# Patient Record
Sex: FEMALE
[De-identification: names, ages, dates, MRNs, and addresses within clinical notes are randomized per-mention and may not be internally consistent; named-entity substitution may affect disease eponyms.]

---

## 2021-02-11 ENCOUNTER — APPOINTMENT (OUTPATIENT)
Dept: PEDIATRIC ALLERGY IMMUNOLOGY | Facility: CLINIC | Age: 21
End: 2021-02-11

## 2021-04-12 ENCOUNTER — APPOINTMENT (OUTPATIENT)
Dept: PEDIATRIC ALLERGY IMMUNOLOGY | Facility: CLINIC | Age: 21
End: 2021-04-12
Payer: MEDICAID

## 2021-04-12 VITALS
TEMPERATURE: 97.8 F | HEIGHT: 58 IN | WEIGHT: 172 LBS | DIASTOLIC BLOOD PRESSURE: 70 MMHG | SYSTOLIC BLOOD PRESSURE: 110 MMHG | BODY MASS INDEX: 36.11 KG/M2

## 2021-04-12 PROBLEM — Z00.00 ENCOUNTER FOR PREVENTIVE HEALTH EXAMINATION: Status: ACTIVE | Noted: 2021-04-12

## 2021-04-12 PROCEDURE — 95004 PERQ TESTS W/ALRGNC XTRCS: CPT

## 2021-04-12 PROCEDURE — 99204 OFFICE O/P NEW MOD 45 MIN: CPT | Mod: 25

## 2021-04-12 PROCEDURE — 99072 ADDL SUPL MATRL&STAF TM PHE: CPT

## 2021-04-12 NOTE — ASSESSMENT
[FreeTextEntry1] : Allergy skin prick testing revealed positives to tree pollen, weeds and dust mites. She will return next week to complete testing.

## 2021-04-12 NOTE — PHYSICAL EXAM
[Alert] : alert [Well Nourished] : well nourished [Healthy Appearance] : healthy appearance [No Acute Distress] : no acute distress [Well Developed] : well developed [Normal Pupil & Iris Size/Symmetry] : normal pupil and iris size and symmetry [No Discharge] : no discharge [No Photophobia] : no photophobia [Sclera Not Icteric] : sclera not icteric [Normal TMs] : both tympanic membranes were normal [Normal Lips/Tongue] : the lips and tongue were normal [Normal Outer Ear/Nose] : the ears and nose were normal in appearance [Normal Tonsils] : normal tonsils [No Thrush] : no thrush [Pale mucosa] : pale mucosa [Boggy Nasal Turbinates] : boggy and/or pale nasal turbinates [Posterior Pharyngeal Cobblestoning] : posterior pharyngeal cobblestoning [Clear Rhinorrhea] : clear rhinorrhea was seen [Supple] : the neck was supple [Normal Rate and Effort] : normal respiratory rhythm and effort [No Crackles] : no crackles [No Retractions] : no retractions [Bilateral Audible Breath Sounds] : bilateral audible breath sounds [Normal Rate] : heart rate was normal  [Normal S1, S2] : normal S1 and S2 [No murmur] : no murmur [Regular Rhythm] : with a regular rhythm [Soft] : abdomen soft [Not Tender] : non-tender [Not Distended] : not distended [No HSM] : no hepato-splenomegaly [Normal Cervical Lymph Nodes] : cervical [Skin Intact] : skin intact  [No Rash] : no rash [No Skin Lesions] : no skin lesions [No clubbing] : no clubbing [No Edema] : no edema [No Cyanosis] : no cyanosis [Normal Mood] : mood was normal [Normal Affect] : affect was normal [Alert, Awake, Oriented as Age-Appropriate] : alert, awake, oriented as age appropriate

## 2021-04-12 NOTE — REASON FOR VISIT
[Initial Evaluation] : an initial evaluation of [Hay Fever] : hay fever [Congestion] : congestion [Runny Nose] : runny nose [Itchy Eyes] : itchy eyes [Discharge of Eyes] : discharge of eyes [Asthma] : asthma [Other: _____] : [unfilled]

## 2021-04-12 NOTE — HISTORY OF PRESENT ILLNESS
[Cough] : cough [Wheezing] : wheezing  [de-identified] : MAICOL MEDINA is a 20 year year old female with a history of asthma since childhood with a nebulizer, not hospitalized. SHe continued to suffer from asthma. Year round nasal allergy symptoms. Sneezing, itchy nose, itchy eyes. Currently taking medications listed below. She has a dog in the house. And her nasal allergy symptoms are gradually getting worse over the years. And she wants to get treated.

## 2021-04-12 NOTE — SOCIAL HISTORY
[Apartment] : [unfilled] lives in an apartment  [Radiator/Baseboard] : heating provided by radiator(s)/baseboard(s) [Window Units] : air conditioning provided by window units [Humidifier] : uses a humidifier [Dry] : dry [Dog] : dog [Single] : single [FreeTextEntry2] : [employer] [Dust Mite Covers] : does not have dust mite covers [Bedroom] : not in the bedroom [Basement] : not in the basement [Living Area] : not in the living area [Smokers in Household] : there are no smokers in the home [de-identified] : air purifier as well [de-identified] : Brendan

## 2021-04-12 NOTE — IMPRESSION
[Allergy Testing Dust Mite] : dust mites [Allergy Testing Cockroach] : cockroach [Allergy Testing Mixed Feathers] : feathers [Allergy Testing Dog] : dog [Allergy Testing Cat] : cat [] : molds [Allergy Testing Trees] : trees [Allergy Testing Weeds] : weeds [Allergy Testing Grasses] : grasses [________] : [unfilled]

## 2021-04-12 NOTE — REVIEW OF SYSTEMS
[Rhinorrhea] : rhinorrhea [Nasal Congestion] : nasal congestion [Sore Throat] : sore throat [Post Nasal Drip] : post nasal drip [Cough] : cough [Wheezing] : wheezing [Nl] : Genitourinary

## 2021-04-19 ENCOUNTER — APPOINTMENT (OUTPATIENT)
Dept: PEDIATRIC ALLERGY IMMUNOLOGY | Facility: CLINIC | Age: 21
End: 2021-04-19
Payer: MEDICAID

## 2021-04-19 VITALS — HEIGHT: 58 IN | BODY MASS INDEX: 36.11 KG/M2 | WEIGHT: 172 LBS

## 2021-04-19 PROCEDURE — 95004 PERQ TESTS W/ALRGNC XTRCS: CPT

## 2021-04-19 PROCEDURE — 99072 ADDL SUPL MATRL&STAF TM PHE: CPT

## 2021-04-19 PROCEDURE — 99212 OFFICE O/P EST SF 10 MIN: CPT | Mod: 25

## 2021-04-19 NOTE — HISTORY OF PRESENT ILLNESS
[None] : The patient is currently asymptomatic [de-identified] : MAICOL MEDINA is a 20 year year old female with a history of asthma since childhood with a nebulizer, not hospitalized. SHe continued to suffer from asthma. Year round nasal allergy symptoms. Sneezing, itchy nose, itchy eyes. Currently taking medications listed below. She has a dog in the house. And her nasal allergy symptoms are gradually getting worse over the years. And she wants to get treated.

## 2021-08-05 ENCOUNTER — APPOINTMENT (OUTPATIENT)
Dept: PEDIATRIC ALLERGY IMMUNOLOGY | Facility: CLINIC | Age: 21
End: 2021-08-05
Payer: MEDICAID

## 2021-08-05 VITALS — BODY MASS INDEX: 35.05 KG/M2 | WEIGHT: 167 LBS | HEIGHT: 58 IN

## 2021-08-05 DIAGNOSIS — H10.10 ACUTE ATOPIC CONJUNCTIVITIS, UNSPECIFIED EYE: ICD-10-CM

## 2021-08-05 PROCEDURE — 99213 OFFICE O/P EST LOW 20 MIN: CPT

## 2021-08-05 NOTE — PHYSICAL EXAM
[Alert] : alert [Well Nourished] : well nourished [Healthy Appearance] : healthy appearance [No Acute Distress] : no acute distress [Well Developed] : well developed [Normal Pupil & Iris Size/Symmetry] : normal pupil and iris size and symmetry [No Discharge] : no discharge [No Photophobia] : no photophobia [Sclera Not Icteric] : sclera not icteric [Normal TMs] : both tympanic membranes were normal [Normal Nasal Mucosa] : the nasal mucosa was normal [Normal Lips/Tongue] : the lips and tongue were normal [Normal Outer Ear/Nose] : the ears and nose were normal in appearance [Normal Tonsils] : normal tonsils [No Thrush] : no thrush [Supple] : the neck was supple [Normal Rate and Effort] : normal respiratory rhythm and effort [No Crackles] : no crackles [No Retractions] : no retractions [Bilateral Audible Breath Sounds] : bilateral audible breath sounds [Wheezing] : wheezing was heard [Normal Rate] : heart rate was normal  [Normal S1, S2] : normal S1 and S2 [No murmur] : no murmur [Regular Rhythm] : with a regular rhythm [Soft] : abdomen soft [Not Tender] : non-tender [Not Distended] : not distended [No HSM] : no hepato-splenomegaly [Normal Cervical Lymph Nodes] : cervical [Skin Intact] : skin intact  [No Rash] : no rash [No Skin Lesions] : no skin lesions [No clubbing] : no clubbing [No Edema] : no edema [No Cyanosis] : no cyanosis [Normal Mood] : mood was normal [Normal Affect] : affect was normal [Alert, Awake, Oriented as Age-Appropriate] : alert, awake, oriented as age appropriate [Pale mucosa] : no pale mucosa [de-identified] : End expiratory wheezing

## 2021-08-05 NOTE — HISTORY OF PRESENT ILLNESS
[de-identified] : MAICOL MEDINA is a 20 year year old female with a history of asthma since childhood with a nebulizer, not hospitalized. SHe continued to suffer from asthma. Year round nasal allergy symptoms. Sneezing, itchy nose, itchy eyes. Currently taking medications listed below. She has a dog in the house. And her nasal allergy symptoms are gradually getting worse over the years. And she wants to get treated. She is being followed by Dr Esteban García for her asthma. He just started her on Fasenra and she has done well on it for the last 3 months.

## 2021-08-05 NOTE — ASSESSMENT
[FreeTextEntry1] : Allergy skin prick testing revealed positives to tree pollen, weeds and dust mites. Intradermal testing revealed positives to grass pollen, molds cat and dog. She has been on Fasenra under the care of her pulmonologist over the last 3 months with no issues. I will see her in Early November and consider immunotherapy after her current treatment is over.

## 2021-12-09 ENCOUNTER — APPOINTMENT (OUTPATIENT)
Dept: PEDIATRIC ALLERGY IMMUNOLOGY | Facility: CLINIC | Age: 21
End: 2021-12-09
Payer: MEDICAID

## 2021-12-09 VITALS
DIASTOLIC BLOOD PRESSURE: 70 MMHG | HEIGHT: 58 IN | SYSTOLIC BLOOD PRESSURE: 100 MMHG | BODY MASS INDEX: 35.05 KG/M2 | WEIGHT: 167 LBS

## 2021-12-09 DIAGNOSIS — J45.909 UNSPECIFIED ASTHMA, UNCOMPLICATED: ICD-10-CM

## 2021-12-09 DIAGNOSIS — J30.9 ALLERGIC RHINITIS, UNSPECIFIED: ICD-10-CM

## 2021-12-09 PROCEDURE — 99213 OFFICE O/P EST LOW 20 MIN: CPT

## 2021-12-09 NOTE — HISTORY OF PRESENT ILLNESS
[None] : The patient is currently asymptomatic [de-identified] : MAICOL MEDINA is a 20 year year old female with a history of asthma since childhood with a nebulizer, not hospitalized. SHe continued to suffer from asthma. Year round nasal allergy symptoms. Sneezing, itchy nose, itchy eyes. Currently taking medications listed below. She has a dog in the house. And her nasal allergy symptoms are gradually getting worse over the years. And she wants to get treated. She is being followed by Dr Esteban García, pulmonologist for her asthma. He just started her on Fasenra and she has done well on it for the last several months. She states she has nasal itching and a lot of sneezing in the mornings.

## 2021-12-09 NOTE — REASON FOR VISIT
[Routine Follow-Up] : a routine follow-up visit for [Hay Fever] : hay fever [Asthma] : asthma [Other: _____] : [unfilled]

## 2021-12-09 NOTE — ASSESSMENT
[FreeTextEntry1] : The patient is a 21 year old female with Allergic Rhinitis and Bronchial Asthma. She is on Incruse and Fluticasone-Salmeterol inhalers as well as Montelukast 10mg and  Fasenra under the care of her pulmonologist Dr. García over the last few months with no issues. I have recommended she take half a Zyrtec at bedtime and use Saline Nasal gel. I will see her late February 2022 and consider immunotherapy after her current treatment is over. \par \par

## 2021-12-09 NOTE — PHYSICAL EXAM
